# Patient Record
Sex: FEMALE | Race: BLACK OR AFRICAN AMERICAN | NOT HISPANIC OR LATINO | Employment: UNEMPLOYED | URBAN - METROPOLITAN AREA
[De-identification: names, ages, dates, MRNs, and addresses within clinical notes are randomized per-mention and may not be internally consistent; named-entity substitution may affect disease eponyms.]

---

## 2018-05-25 ENCOUNTER — OFFICE VISIT (OUTPATIENT)
Dept: OPHTHALMOLOGY | Facility: CLINIC | Age: 38
End: 2018-05-25
Attending: OPHTHALMOLOGY
Payer: COMMERCIAL

## 2018-05-25 DIAGNOSIS — H40.1190 POAG (PRIMARY OPEN-ANGLE GLAUCOMA): Primary | ICD-10-CM

## 2018-05-25 DIAGNOSIS — H40.1123 PRIMARY OPEN ANGLE GLAUCOMA OF LEFT EYE, SEVERE STAGE: ICD-10-CM

## 2018-05-25 PROCEDURE — 92083 EXTENDED VISUAL FIELD XM: CPT | Mod: ZF | Performed by: OPHTHALMOLOGY

## 2018-05-25 PROCEDURE — G0463 HOSPITAL OUTPT CLINIC VISIT: HCPCS | Mod: ZF

## 2018-05-25 RX ORDER — ZOLPIDEM TARTRATE 10 MG/1
1 TABLET ORAL
Refills: 1 | COMMUNITY
Start: 2018-05-17

## 2018-05-25 RX ORDER — HYDROXYCHLOROQUINE SULFATE 200 MG/1
200 TABLET, FILM COATED ORAL 2 TIMES DAILY
COMMUNITY
Start: 2018-02-08 | End: 2019-02-08

## 2018-05-25 RX ORDER — DORZOLAMIDE HYDROCHLORIDE AND TIMOLOL MALEATE 20; 5 MG/ML; MG/ML
1 SOLUTION/ DROPS OPHTHALMIC 2 TIMES DAILY
Qty: 1 BOTTLE | Refills: 11 | Status: SHIPPED | OUTPATIENT
Start: 2018-05-25

## 2018-05-25 ASSESSMENT — TONOMETRY
OD_IOP_MMHG: 16
IOP_METHOD: APPLANATION
OS_IOP_MMHG: 16

## 2018-05-25 ASSESSMENT — REFRACTION_WEARINGRX
OD_CYLINDER: +2.25
OS_SPHERE: -8.50
OD_AXIS: 078
OD_SPHERE: -4.50
OS_CYLINDER: +4.00
OS_AXIS: 088

## 2018-05-25 ASSESSMENT — SLIT LAMP EXAM - LIDS
COMMENTS: NORMAL
COMMENTS: MILD PTOSIS

## 2018-05-25 ASSESSMENT — CONF VISUAL FIELD
METHOD: COUNTING FINGERS
OS_INFERIOR_NASAL_RESTRICTION: 1
OS_INFERIOR_TEMPORAL_RESTRICTION: 1
OS_SUPERIOR_TEMPORAL_RESTRICTION: 1
OD_NORMAL: 1
OS_SUPERIOR_NASAL_RESTRICTION: 1

## 2018-05-25 ASSESSMENT — VISUAL ACUITY
METHOD_MR: PT DECLINES MR TODAY
METHOD: SNELLEN - LINEAR
OS_CC: 125
CORRECTION_TYPE: GLASSES
OS_PH_CC: 20/80
OD_CC: 20/20

## 2018-05-25 ASSESSMENT — CUP TO DISC RATIO
OD_RATIO: 0.4
OS_RATIO: 0.9

## 2018-05-25 NOTE — NURSING NOTE
Chief Complaints and History of Present Illnesses   Patient presents with     Glaucoma Follow Up     HPI    Affected eye(s):  Both   Symptoms:     Redness   Tearing   Dryness   Itching      Frequency:  Intermittent       Do you have eye pain now?:  No      Comments:  Per pt no changes in va BE. Pt diagnosed with Lupus about a year and half ago. Pt concerned about medication she is has to take for Lupus and that it could affect her Glaucoma. Pt notes LE red, watery, and itchy (intermitent) x 1 year.     Batsheva Joy@ [facility] 9:46 AM May 25, 2018

## 2018-05-25 NOTE — PROGRESS NOTES
CC: Glaucoma follow up    HPI: Haroon Mcrae is a 37 year old female with advanced stage glaucoma of the left eye s/p trabeculectomy by Dr Pressley in 2009. Patient is unsure of the etiology of her glaucoma.  Patient reports sudden loss of vision left eye in 2007.She notes stable vision. Occasional redness and watering of her left eye.     Past Ocular History:  Trabeculectomy left eye 2015 (German Grider)  Strabismus LLRc and LMRs 10-31-13    3-19-15 Exploration and lysis of adhesions on left medial rectus muscle. Excision of 5.0 mm of stretch scar of left medial rectus muscle. Recession/hangback of left medial rectus muscle 2.0 mm Left IRs of 5.0 millimeters.  Prolapsed orbital fat ST left eye      Current meds:  Cosopt 1 drop twice daily both eyes     Testing Today:     Octopus visual field                         24-2 right eye normal                        LVC left eye extinguished     OCT rNFL  OD: Nasal change from 2015 otherwise stable with temporal thinning  OS: severe thinning, SN change from 2015     Impression/Plan:  Advanced stage glaucoma left eye, ocular hypertension right eye vs pre-perimetric glaucoma.    -Goal mid teens  -intraocular pressure at target both eyes     Return to clinic in 4-6 months for IOP check and dilated exam      Lars Borden MD  Ophthalmology, PGY-3    Attending Physician Attestation:  Complete documentation of historical and exam elements from today's encounter can be found in the full encounter summary report (not reduplicated in this progress note). I personally obtained the chief complaint(s) and history of present illness. I confirmed and edited asnecessary the review of systems, past medical/surgical history, family history, social history, and examination findings as documented by others; and I examined the patient myself. I personally reviewed the relevant tests, images, and reports as documented above. I formulated and edited as necessary the assessment and plan and  discussed the findings and management plan with the patient and family.  - Romina Combs MD 12:18 PM 5/25/2018

## 2018-05-25 NOTE — MR AVS SNAPSHOT
After Visit Summary   2018    Haroon Mcrae    MRN: [id number]           Patient Information     Date Of Birth          1980        Visit Information        Provider Department      2018 9:30 AM Romina Combs MD [facility]        Today's Diagnoses     POAG (primary open-angle glaucoma)    -  1    Primary open angle glaucoma of left eye, severe stage           Follow-ups after your visit        Follow-up notes from your care team     Return in about 6 months (around 2018) for dilation and 10-2 od .      Your next 10 appointments already scheduled     2018  8:15 AM CST   RETURN GLAUCOMA with Romina Combs MD   Eye Clinic ([facility])    [address]  [facility] [id number] [phone]            2018  9:00 AM CST   NEW RETINA with Pao Waller MD   Eye Clinic ([facility])    [address] G[address]  [address] [address]  [facility] [id number] [phone]              Who to contact     Please call your clinic at [phone] to:    Ask questions about your health    Make or cancel appointments    Discuss your medicines    Learn about your test results    Speak to your doctor            Additional Information About Your Visit        MyChart Information     BUKAt is an electronic gateway that provides easy, online access to your medical records. With Yeahka, you can request a clinic appointment, read your test results, renew a prescription or communicate with your care team.     To sign up for BUKAt visit the website at [email or web address]   You will be asked to enter the access code listed below, as well as some personal information. Please follow the directions to create your username and password.     Your access code is: [email or web address]  Expires: 2018  6:31 AM     Your access code will  in 90 days. If you need help or a new code,  please contact your [facility] or call [phone] for assistance.        Care EveryWhere ID     This is your Care EveryWhere ID. This could be used by other organizations to access your [facility] medical records  [id number]         Blood Pressure from Last 3 Encounters:   03/19/15 129/78   10/31/13 133/87    Weight from Last 3 Encounters:   03/19/15 102.2 kg (225 lb 5 oz)   10/31/13 98.5 kg (217 lb 2.5 oz)              We Performed the Following     Low Vision Central OS     OVF 24-2 Dynamic OD          Where to get your medicines      These medications were sent to [facility] [address] [place] [address] & [address] [address]     Phone:  [phone]     dorzolamide-timolol 2-0.5 % ophthalmic solution          Primary Care Provider Fax #    Physician No Ref-Primary [phone]       No address on file        Equal Access to Services     JARETT ALONZO : Hadii aad ku hadasho Soomaali, waaxda luqadaha, qaybta kaalmada adeegyada, waxay fernandezin brando jaramillo . So [facility] [phone].    ATENCIÓN: Si habla español, tiene a montes disposición servicios gratuitos de asistencia lingüística. Llame al [phone].    We comply with applicable federal civil rights laws and [place] laws. We do not discriminate on the basis of race, color, national origin, age, disability, sex, sexual orientation, or gender identity.            Thank you!     Thank you for choosing [facility]  for your care. Our goal is always to provide you with excellent care. Hearing back from our patients is one way we can continue to improve our services. Please take a few minutes to complete the written survey that you may receive in the mail after your visit with us. Thank you!             Your Updated Medication List - Protect others around you: Learn how to safely use, store and throw away your medicines at www.disposemymeds.org.           This list is accurate as of 5/25/18 12:26 PM.  Always use your most recent med list.                   Brand Name Dispense Instructions for use Diagnosis    dorzolamide-timolol 2-0.5 % ophthalmic solution    COSOPT    1 Bottle    Place 1 drop into both eyes 2 times daily    Primary open angle glaucoma of left eye, severe stage       hydroxychloroquine 200 MG tablet    PLAQUENIL     Take 200 mg by mouth 2 times daily    POAG (primary open-angle glaucoma)       zolpidem 10 MG tablet    AMBIEN     1 tablet nightly as needed    POAG (primary open-angle glaucoma)

## 2021-07-22 ENCOUNTER — OFFICE VISIT (OUTPATIENT)
Dept: OPHTHALMOLOGY | Facility: CLINIC | Age: 41
End: 2021-07-22
Attending: OPHTHALMOLOGY
Payer: COMMERCIAL

## 2021-07-22 DIAGNOSIS — H25.13 AGE-RELATED NUCLEAR CATARACT OF BOTH EYES: ICD-10-CM

## 2021-07-22 DIAGNOSIS — H40.1133 PRIMARY OPEN ANGLE GLAUCOMA (POAG) OF BOTH EYES, SEVERE STAGE: Primary | ICD-10-CM

## 2021-07-22 PROCEDURE — 92133 CPTRZD OPH DX IMG PST SGM ON: CPT | Performed by: OPHTHALMOLOGY

## 2021-07-22 PROCEDURE — 99204 OFFICE O/P NEW MOD 45 MIN: CPT | Mod: GC | Performed by: OPHTHALMOLOGY

## 2021-07-22 PROCEDURE — 92083 EXTENDED VISUAL FIELD XM: CPT | Performed by: OPHTHALMOLOGY

## 2021-07-22 PROCEDURE — G0463 HOSPITAL OUTPT CLINIC VISIT: HCPCS

## 2021-07-22 RX ORDER — TIMOLOL MALEATE 5 MG/ML
1 SOLUTION/ DROPS OPHTHALMIC 2 TIMES DAILY
Qty: 10 ML | Refills: 4 | Status: SHIPPED | OUTPATIENT
Start: 2021-07-22

## 2021-07-22 RX ORDER — TRAZODONE HYDROCHLORIDE 50 MG/1
TABLET, FILM COATED ORAL
COMMUNITY
Start: 2021-07-02

## 2021-07-22 ASSESSMENT — VISUAL ACUITY
CORRECTION_TYPE: GLASSES
METHOD: SNELLEN - LINEAR
OD_CC: 20/20
OS_CC: 20/125

## 2021-07-22 ASSESSMENT — REFRACTION_WEARINGRX
OS_SPHERE: -8.00
OD_CYLINDER: +1.75
SPECS_TYPE: SVL
OD_AXIS: 081
OS_AXIS: 085
OD_SPHERE: -2.25
OS_CYLINDER: +4.00

## 2021-07-22 ASSESSMENT — CONF VISUAL FIELD
OD_NORMAL: 1
OS_SUPERIOR_TEMPORAL_RESTRICTION: 3
OS_INFERIOR_TEMPORAL_RESTRICTION: 1
OS_SUPERIOR_NASAL_RESTRICTION: 1
METHOD: COUNTING FINGERS
OS_INFERIOR_NASAL_RESTRICTION: 1

## 2021-07-22 ASSESSMENT — TONOMETRY
OS_IOP_MMHG: 8
IOP_METHOD: TONOPEN
IOP_METHOD: APPLANATION
OD_IOP_MMHG: 11
OD_IOP_MMHG: 15
OS_IOP_MMHG: 18
OD_IOP_MMHG: 12
OS_IOP_MMHG: 8
IOP_METHOD: APPLANATION

## 2021-07-22 ASSESSMENT — CUP TO DISC RATIO
OD_RATIO: 0.4
OS_RATIO: 0.9

## 2021-07-22 ASSESSMENT — SLIT LAMP EXAM - LIDS
COMMENTS: NORMAL
COMMENTS: MILD PTOSIS

## 2021-07-22 NOTE — NURSING NOTE
Chief Complaints and History of Present Illnesses   Patient presents with     Consult For     glaucoma     Chief Complaint(s) and History of Present Illness(es)     Consult For     Laterality: both eyes    Course: stable    Associated symptoms: flashes.  Negative for eye pain, headache and floaters    Treatments tried: no treatments    Pain scale: 0/10    Comments: glaucoma              Comments     She was referred here after her recent eye exam from Rhoda Hernández O.D.  Her vision has seemed stable in both eyes for the past couple years.  She has not taken Cosopt for the past year.      Occasionally she sees flashing in her right eye in the temporal periphery.    Cailin Shah, COT 1:53 PM  July 22, 2021

## 2021-07-22 NOTE — PROGRESS NOTES
Chief Complaint/Presenting Concern: Glaucoma Evaluation     History of Present Illness:   Haroon Mcrae is a 40 year old patient with a hx of Lupus who presents for evaluation of glaucoma. Patient has a hx of advanced stage glaucoma left eye s/p trabeculectomy by Dr. Pressley in 2009. Unsure the etiology of vision loss -- noticed sudden loss of vision in the left eye occurring in 2007. She has been last evaluated by Dr. Combs back in 5/2018 - and was continued on Cosopt to use in both eyes BID.     She reports that she had a new MRx obtained about 3 days ago from [facility]. She is happy with the vision and the glasses, without strain or headache with this. Overall she reports that her vision is stable -- she reports that the left eye has difficulty seeing and right eye is seeing well. She reports that she stopped using the Cosopt about a year ago. She denies any eye pain. Not following anyone for strabismus. No diplopia. Was told to come to get evaluated due to high IOP in the right eye.    She reports that all of a sudden back in 2007 she was working when she started noticing painless vision blurring along with drifting of her left eye and that was when she experienced vision loss.     Relevant Past Medical/Family/Social History:  - Hx of Lupus (Not on plaquenil currently).     Relevant Review of Systems:     Diagnosis: Primary open angle glaucoma  Advanced stage left eye; Ocular HTN right eye   Year diagnosis: 2007~   Previous glaucoma surgery/laser:   - Trabeculectomy left eye with mitomycin C (2009) - Dr. Grider.   Maximum intraocular pressure: 27/34  Currently Meds: Cosopt BID each eye   Family history: negative  CCT: 579 / 561   Gonio:   - Right Eye: Open to scleral spur x 4 quadrants -- ITC dispersed and present with < 180 degrees    - Left Eye: Open to scleral spur x 4 quadrants -- ITC dispersed and present with ~180 degrees - heavily pigmented superiorly   Refractive status: Axial myopia   Trauma  history: negative  Steroid exposure: positive (She may have used steroids periocularly around the time of surgery).   Vasospastic disease: Migrane/Raynaud phenomenon: negative  A past hemodynamic crisis or Low BP: negative  Meds AEs/intolerance: No  PMHx: No hx of asthma and respiratory problems. Was to obtain open heart cardiac surgery -- w/ anomlous coronary artery (although did not undergo this). No hx of renal/Kidney stones. No hx of sulfa allergies.   Anticoagulants: No    Today's testing:  IOP:  12 / 8 mmHg   Visual field July 22, 2020  - Right eye - Normal, reliable  - Left eye - MD = -33, generalized depression, reliable  OCT Optic Nerve RNFL Spectralis July 22, 2021  - Right Eye:  Temporal thinning, superonasal thinning compared to baseline OCT  - Left Eye: Diffuse thinning -- overall stable.    Additional Ocular History:     2) Hx of Strabismus Surgery  - Strabismus LLRc and LMRs: 10-31-13    - Exploration and lysis of adhesions on left medial rectus muscle. Excision of 5.0 mm of stretch scar of left medial rectus muscle. Recession/hangback of left medial rectus muscle 2.0 mm Left IRs of 5.0 millimeters: 3-19-15     3) Prolapsed orbital fat ST left eye       Plan/Recommendations:    Discussed findings with patient.    Patient has Advanced glaucoma in the left eye, and is a glaucoma suspect in the right eye with possible evidence of early RNFL thinning on OCT in the right eye.    IOP is lower than her baseline despite having stopped using Cosopt. She has been using a marijuana extract (Delta 8) in the form of pretzels, she consumes about 4 a day and has taken them before her clinic visit today. This may explain her good IOP. Given unclear efficacy and long lasting effect of the extract throughout the day, advised the patient to remain on glaucoma drops.    Start Timolol instead of Cosopt to avoid too low of a pressure.       RTC 3-4 weeks VA, IOP     Bhavesh Randle MD - PGY3   Department of  Ophthalmology  Pager: [phone]      Physician Attestation     Attending Physician Attestation:  Complete documentation of historical and exam elements from today's encounter can be found in the full encounter summary report (not reduplicated in this progress note). I personally obtained the chief complaint(s) and history of present illness. I confirmed and edited as necessary the review of systems, past medical/surgical history, family history, social history, and examination findings as documented by others; and I examined the patient myself. I personally reviewed the relevant tests, images, and reports as documented above. I personally reviewed the ophthalmic test(s) associated with this encounter, agree with the interpretation(s) as documented by the resident/fellow and have edited the corresponding report(s) as necessary. I formulated and edited as necessary the assessment and plan and discussed the findings and management plan with the patient and any family members present at the time of the visit.  Fadia Cardoza M.D., Glaucoma, July 22, 2021

## 2021-11-05 ENCOUNTER — OFFICE VISIT (OUTPATIENT)
Dept: OPHTHALMOLOGY | Facility: CLINIC | Age: 41
End: 2021-11-05
Attending: OPHTHALMOLOGY
Payer: COMMERCIAL

## 2021-11-05 DIAGNOSIS — H25.13 AGE-RELATED NUCLEAR CATARACT OF BOTH EYES: ICD-10-CM

## 2021-11-05 DIAGNOSIS — H40.1133 PRIMARY OPEN ANGLE GLAUCOMA (POAG) OF BOTH EYES, SEVERE STAGE: Primary | ICD-10-CM

## 2021-11-05 PROCEDURE — G0463 HOSPITAL OUTPT CLINIC VISIT: HCPCS

## 2021-11-05 PROCEDURE — 99214 OFFICE O/P EST MOD 30 MIN: CPT | Performed by: OPHTHALMOLOGY

## 2021-11-05 ASSESSMENT — TONOMETRY
OD_IOP_MMHG: 14
OS_IOP_MMHG: 19
IOP_METHOD: APPLANATION

## 2021-11-05 ASSESSMENT — CONF VISUAL FIELD
OD_NORMAL: 1
METHOD: COUNTING FINGERS
OS_SUPERIOR_NASAL_RESTRICTION: 1
OS_INFERIOR_NASAL_RESTRICTION: 1
OS_INFERIOR_TEMPORAL_RESTRICTION: 1
OS_SUPERIOR_TEMPORAL_RESTRICTION: 3

## 2021-11-05 ASSESSMENT — VISUAL ACUITY
METHOD: SNELLEN - LINEAR
CORRECTION_TYPE: GLASSES
OS_CC: 20/100
OS_PH_CC: 20/60
OS_PH_CC+: -2
OD_CC+: -1
OD_CC: 20/20
OS_CC+: -1

## 2021-11-05 ASSESSMENT — SLIT LAMP EXAM - LIDS
COMMENTS: MILD PTOSIS
COMMENTS: NORMAL

## 2021-11-05 NOTE — NURSING NOTE
Chief Complaints and History of Present Illnesses   Patient presents with     Glaucoma Follow-Up     Chief Complaint(s) and History of Present Illness(es)     Glaucoma Follow-Up     Laterality: left eye    Quality: blurred    Associated symptoms: discharge (le).  Negative for eye pain, itching, burning and dryness    Pain scale: 0/10              Comments     Concerned about white fluid 'maybe leaking out of my LE and it crusts onto my LLL.'  Cosopt BID to BE / LD @ 8 am on Monday.  Timolol every morning to BE / 8 am on Monday  EDEN Marley 7:59 AM 11/05/2021

## 2021-11-05 NOTE — PROGRESS NOTES
Chief Complaint/Presenting Concern: Glaucoma follow up    History of Present Illness:   Haroon Mcrae is a 40 year old patient with a hx of Lupus who presents for evaluation of glaucoma. Patient has a hx of advanced stage glaucoma left eye s/p trabeculectomy by Dr. Pressley in 2009. On most recent visit was asked to start timolol, reports being complaint with drop. No change in vision, no eye pain.     Relevant Past Medical/Family/Social History:  - Hx of Lupus (Not on plaquenil currently).     Relevant Review of Systems:     Diagnosis: Primary open angle glaucoma  Advanced stage left eye; Ocular HTN right eye   Year diagnosis: 2007~   Previous glaucoma surgery/laser:   - Trabeculectomy left eye with mitomycin C (2009) - Dr. Grider.   Maximum intraocular pressure: 27/34  Currently Meds: Cosopt BID each eye   Family history: negative  CCT: 579 / 561   Gonio:   - Right Eye: Open to scleral spur x 4 quadrants -- ITC dispersed and present with < 180 degrees    - Left Eye: Open to scleral spur x 4 quadrants -- ITC dispersed and present with ~180 degrees - heavily pigmented superiorly   Refractive status: Axial myopia   Trauma history: negative  Steroid exposure: positive (She may have used steroids periocularly around the time of surgery).   Vasospastic disease: Migrane/Raynaud phenomenon: negative  A past hemodynamic crisis or Low BP: negative  Meds AEs/intolerance: No  PMHx: No hx of asthma and respiratory problems. Was to obtain open heart cardiac surgery -- w/ anomlous coronary artery (although did not undergo this). No hx of renal/Kidney stones. No hx of sulfa allergies.   Anticoagulants: No  Visual field July 22, 2020  - Right eye - Normal, reliable  - Left eye - MD = -33, generalized depression, reliable  OCT Optic Nerve RNFL Spectralis July 22, 2021  - Right Eye:  Temporal thinning, superonasal thinning compared to baseline OCT  - Left Eye: Diffuse thinning -- overall stable.    Today's testing:  IOP:  12 / 8  mmHg     Additional Ocular History:     2) Hx of Strabismus Surgery  - Strabismus LLRc and LMRs: 10-31-13    - Exploration and lysis of adhesions on left medial rectus muscle. Excision of 5.0 mm of stretch scar of left medial rectus muscle. Recession/hangback of left medial rectus muscle 2.0 mm Left IRs of 5.0 millimeters: 3-19-15     3) Prolapsed orbital fat ST left eye     Plan/Recommendations:    Discussed findings with patient.    Patient has Advanced glaucoma in the left eye, and is a glaucoma suspect in the right eye with possible evidence of early RNFL thinning on OCT in the right eye.    IOP higher than target left eye, today off Marijuana. She has been using a marijuana extract (Delta 8) in the form of pretzels, she consumes about 4 a day, which explains the lower IOP on her prior visit.     Switch from Timolol to Cosopt      RTC in 2 months OCT RNFL      Physician Attestation     Attending Physician Attestation:  Complete documentation of historical and exam elements from today's encounter can be found in the full encounter summary report (not reduplicated in this progress note). I personally obtained the chief complaint(s) and history of present illness. I confirmed and edited as necessary the review of systems, past medical/surgical history, family history, social history, and examination findings as documented by others; and I examined the patient myself. I personally reviewed the relevant tests, images, and reports as documented above. I formulated and edited as necessary the assessment and plan and discussed the findings and management plan with the patient and any family members present at the time of the visit.  Fadia Cardoza M.D., Glaucoma, November 7, 2021

## 2021-12-14 DIAGNOSIS — H47.239 GLAUCOMATOUS ATROPHY OF OPTIC DISC, UNSPECIFIED LATERALITY: Primary | ICD-10-CM

## 2022-02-09 DIAGNOSIS — H40.1133 PRIMARY OPEN ANGLE GLAUCOMA (POAG) OF BOTH EYES, SEVERE STAGE: ICD-10-CM

## 2022-02-09 DIAGNOSIS — H47.239 GLAUCOMATOUS ATROPHY OF OPTIC DISC, UNSPECIFIED LATERALITY: Primary | ICD-10-CM

## 2022-03-28 DIAGNOSIS — H40.1133 PRIMARY OPEN ANGLE GLAUCOMA (POAG) OF BOTH EYES, SEVERE STAGE: Primary | ICD-10-CM

## 2022-03-29 ENCOUNTER — TELEPHONE (OUTPATIENT)
Dept: OPHTHALMOLOGY | Facility: CLINIC | Age: 42
End: 2022-03-29
Payer: COMMERCIAL

## 2022-03-29 ENCOUNTER — OFFICE VISIT (OUTPATIENT)
Dept: OPHTHALMOLOGY | Facility: CLINIC | Age: 42
End: 2022-03-29
Attending: OPHTHALMOLOGY
Payer: COMMERCIAL

## 2022-03-29 DIAGNOSIS — H40.1133 PRIMARY OPEN ANGLE GLAUCOMA (POAG) OF BOTH EYES, SEVERE STAGE: ICD-10-CM

## 2022-03-29 PROCEDURE — G0463 HOSPITAL OUTPT CLINIC VISIT: HCPCS

## 2022-03-29 PROCEDURE — 92133 CPTRZD OPH DX IMG PST SGM ON: CPT | Performed by: OPHTHALMOLOGY

## 2022-03-29 PROCEDURE — 99207 PR NON-BILLABLE SERV PER CHARTING: CPT | Performed by: OPHTHALMOLOGY

## 2022-03-29 ASSESSMENT — REFRACTION_WEARINGRX
OS_CYLINDER: +4.00
OD_SPHERE: -2.25
OS_SPHERE: -8.00
SPECS_TYPE: SVL
OD_CYLINDER: +1.75
OD_AXIS: 081
OS_AXIS: 085

## 2022-03-29 ASSESSMENT — TONOMETRY
OS_IOP_MMHG: 22
OD_IOP_MMHG: 24
IOP_METHOD: TONOPEN

## 2022-03-29 ASSESSMENT — VISUAL ACUITY
OS_CC: 20/100
OD_CC: 20/20
CORRECTION_TYPE: GLASSES
METHOD: SNELLEN - LINEAR
OD_CC+: -2

## 2022-03-29 ASSESSMENT — CONF VISUAL FIELD
OS_INFERIOR_NASAL_RESTRICTION: 1
OS_INFERIOR_TEMPORAL_RESTRICTION: 1
OS_SUPERIOR_NASAL_RESTRICTION: 1
OD_NORMAL: 1
OS_SUPERIOR_TEMPORAL_RESTRICTION: 3
METHOD: COUNTING FINGERS

## 2022-03-29 NOTE — NURSING NOTE
Chief Complaints and History of Present Illnesses   Patient presents with     Glaucoma Follow-Up     Chief Complaint(s) and History of Present Illness(es)     Glaucoma Follow-Up     Laterality: both eyes    Associated symptoms: Negative for floaters, itching and dryness    Treatment side effects: none    Compliance with Treatment: always    Pain scale: 0/10              Comments     Glaucoma follow up.    The patient is using Cosopt twice daily in both eyes.  The patient notes an intermittent right eye flash.  LEO Gaines, COA 10:13 AM 03/29/2022

## 2022-03-30 ENCOUNTER — TELEPHONE (OUTPATIENT)
Dept: OPHTHALMOLOGY | Facility: CLINIC | Age: 42
End: 2022-03-30
Payer: COMMERCIAL

## 2022-03-30 NOTE — TELEPHONE ENCOUNTER
[facility] Call Center    Phone Message    May a detailed message be left on voicemail: yes     Reason for Call: Other: Pt called in wanting to talk about yesterdays appt. Please call to discuss. Thank you     Action Taken: Message routed to:  [facility] Eye    Travel Screening: Not Applicable

## 2022-03-30 NOTE — TELEPHONE ENCOUNTER
Called and spoke to Haroon Arndt     Made her an appointment for a follow up in two months with Dr. Trevizo for 5/31 @ 945 am     Dr. Rafal Monroe / Sony Garcia is wondering about the results of her imaging that was done. She had to leave before Dr. Gann could talk to her due to transportation waiting to pick her up.     Thanks     Corinne

## 2022-04-01 NOTE — TELEPHONE ENCOUNTER
Left message: Per Dr. Cardoza, the imaging looks about the same as last time, but your pressure was high. She would like you to see you in office earlier than your next 5/31 appointment to see about lowering it if possible. Please call our scheduling line at [phone] to get that set up.    JAZMIN DORANTES 10:02 AM April 1, 2022

## 2022-10-19 DIAGNOSIS — H40.1133 PRIMARY OPEN ANGLE GLAUCOMA (POAG) OF BOTH EYES, SEVERE STAGE: Primary | ICD-10-CM

## 2022-10-25 ENCOUNTER — OFFICE VISIT (OUTPATIENT)
Dept: OPHTHALMOLOGY | Facility: CLINIC | Age: 42
End: 2022-10-25
Attending: OPHTHALMOLOGY
Payer: COMMERCIAL

## 2022-10-25 DIAGNOSIS — H40.1133 PRIMARY OPEN ANGLE GLAUCOMA (POAG) OF BOTH EYES, SEVERE STAGE: ICD-10-CM

## 2022-10-25 PROCEDURE — G0463 HOSPITAL OUTPT CLINIC VISIT: HCPCS | Mod: 25

## 2022-10-25 PROCEDURE — 92083 EXTENDED VISUAL FIELD XM: CPT | Performed by: OPHTHALMOLOGY

## 2022-10-25 PROCEDURE — 99214 OFFICE O/P EST MOD 30 MIN: CPT | Performed by: OPHTHALMOLOGY

## 2022-10-25 PROCEDURE — 92133 CPTRZD OPH DX IMG PST SGM ON: CPT | Performed by: OPHTHALMOLOGY

## 2022-10-25 RX ORDER — DORZOLAMIDE HYDROCHLORIDE AND TIMOLOL MALEATE PRESERVATIVE FREE 20; 5 MG/ML; MG/ML
1 SOLUTION/ DROPS OPHTHALMIC 2 TIMES DAILY
Qty: 60 EACH | Refills: 4 | Status: SHIPPED | OUTPATIENT
Start: 2022-10-25

## 2022-10-25 ASSESSMENT — CONF VISUAL FIELD
OS_INFERIOR_NASAL_RESTRICTION: 1
OD_INFERIOR_NASAL_RESTRICTION: 0
OD_SUPERIOR_NASAL_RESTRICTION: 0
METHOD: COUNTING FINGERS
OS_SUPERIOR_TEMPORAL_RESTRICTION: 3
OD_SUPERIOR_TEMPORAL_RESTRICTION: 0
OS_SUPERIOR_NASAL_RESTRICTION: 1
OD_INFERIOR_TEMPORAL_RESTRICTION: 0
OD_NORMAL: 1
OS_INFERIOR_TEMPORAL_RESTRICTION: 1

## 2022-10-25 ASSESSMENT — TONOMETRY
OS_IOP_MMHG: 15
OS_IOP_MMHG: 19
IOP_METHOD: TONOPEN
IOP_METHOD: APPLANATION
OD_IOP_MMHG: 17
OD_IOP_MMHG: 15

## 2022-10-25 ASSESSMENT — VISUAL ACUITY
METHOD: SNELLEN - LINEAR
OD_SC+: -1
OS_SC: 20/150
OS_PH_SC+: -1
OD_SC: 20/20
OS_PH_SC: 20/80

## 2022-10-25 ASSESSMENT — SLIT LAMP EXAM - LIDS
COMMENTS: MILD PTOSIS
COMMENTS: NORMAL

## 2022-10-25 NOTE — PATIENT INSTRUCTIONS
Cosopt (Timolol/Dorzolamide) Preservative free which is a blue top- apply 1 drop twice a day in the each eye

## 2022-10-25 NOTE — PROGRESS NOTES
Chief Complaint/Presenting Concern: Glaucoma follow up    History of Present Illness:   Haroon Mcrae is a 40 year old patient with a hx of Lupus who presents for evaluation of glaucoma. Patient has a hx of advanced stage glaucoma left eye s/p trabeculectomy by Dr. Pressley in 2009. On most recent visit was asked to start Cosopt, reports not using the drop due to curing sensation with it's use.     Relevant Past Medical/Family/Social History:  - Hx of Lupus (Not on plaquenil currently).     Relevant Review of Systems:     Diagnosis: Primary open angle glaucoma  Advanced stage left eye; Ocular HTN right eye   Year diagnosis: 2007~   Previous glaucoma surgery/laser:   - Trabeculectomy left eye with mitomycin C (2009) - Dr. Grider.   Maximum intraocular pressure: 27/34  Currently Meds: Cosopt BID each eye   Family history: negative  CCT: 579 / 561   Gonio:   - Right Eye: Open to scleral spur x 4 quadrants -- ITC dispersed and present with < 180 degrees    - Left Eye: Open to scleral spur x 4 quadrants -- ITC dispersed and present with ~180 degrees - heavily pigmented superiorly   Refractive status: Axial myopia   Trauma history: negative  Steroid exposure: positive (She may have used steroids periocularly around the time of surgery).   Vasospastic disease: Migrane/Raynaud phenomenon: negative  A past hemodynamic crisis or Low BP: negative  Meds AEs/intolerance: No  PMHx: No hx of asthma and respiratory problems. Was to obtain open heart cardiac surgery -- w/ anomlous coronary artery (although did not undergo this). No hx of renal/Kidney stones. No hx of sulfa allergies.   Anticoagulants: No    Today's testing:  IOP:  15/15 mmHg  Visual field 10/25/22  - Right eye - Normal, high FP  - Left eye - generalized depression, reliable  OCT Optic Nerve RNFL Spectralis 10/25/22  - Right Eye:  Temporal thinning, superonasal thinning compared to baseline OCT  - Left Eye: Diffuse thinning -- overall stable.    Additional Ocular  History:     2) Hx of Strabismus Surgery  - Strabismus LLRc and LMRs: 10-31-13    - Exploration and lysis of adhesions on left medial rectus muscle. Excision of 5.0 mm of stretch scar of left medial rectus muscle. Recession/hangback of left medial rectus muscle 2.0 mm Left IRs of 5.0 millimeters: 3-19-15     3) Prolapsed orbital fat ST left eye     Plan/Recommendations:    Discussed findings with patient.    Patient has Advanced glaucoma in the left eye, and is a glaucoma suspect in the right eye with possible evidence of early RNFL thinning on OCT in the right eye. I recommend keeping IOP in the low-mid teens left eye and < 18 mmHg right eye. She has had higher IOP readings off treatment recently, I recommend being on treatment each eye. Patient is intolerant to the Cosopt drop (she feels like acid goes in the eye). Offered the patient SLT right eye and had a long discussion with her on the treatment options of drops, SLT or MIGS for the right eye. Long discussion on the risk of blindness from glaucoma, and importance for follow up. She believes the cannabis is the better option, I explained that this is not my recommendation due to the cannabis not controlling IOP reliably and the risk of systemic side effects. She would like to proceed with cannabis card and is not interested in SLT. She will try Cosopt PF each eye     Switch from Cosopt to Cosopt PF each eye    Advised the patient no t to use Marijuana on the day of IOP Check. She is using a marijuana extract (Delta 8) in the form of pretzels, she consumes about 4 a day, which explains the lower IOP on her prior visit.     Refer for refraction     RTC in 6 weeks VA, IOP   Patient does not want VF repeated in the left eye on future visits       Physician Attestation     Attending Physician Attestation:  Complete documentation of historical and exam elements from today's encounter can be found in the full encounter summary report (not reduplicated in this progress  note). I personally obtained the chief complaint(s) and history of present illness. I confirmed and edited as necessary the review of systems, past medical/surgical history, family history, social history, and examination findings as documented by others; and I examined the patient myself. I personally reviewed the relevant tests, images, and reports as documented above. I personally reviewed the ophthalmic test(s) associated with this encounter. I formulated and edited as necessary the assessment and plan and discussed the findings and management plan with the patient and any family members present at the time of the visit.  Fadia Cardoza M.D., Glaucoma, October 25, 2022

## 2022-10-25 NOTE — NURSING NOTE
Chief Complaints and History of Present Illnesses   Patient presents with     Glaucoma Follow-Up     Chief Complaint(s) and History of Present Illness(es)     Glaucoma Follow-Up            Laterality: both eyes          Comments    Pt states her vision is better without glasses.  Pt has not been wearing glasses, except for when she drives at night.  No flashes or floaters.  No pain in either.  No red, dry or teary eyes.     Ocular meds:  dorzolamide-timolol (COSOPT) 1 drop, Both Eyes, 2 TIMES DAILY        timolol maleate (TIMOPTIC) 1 drop, Both Eyes, 2 TIMES DAILY    LIDIA BAILEY October 25, 2022 8:41 AM

## 2022-11-08 ENCOUNTER — TELEPHONE (OUTPATIENT)
Dept: OPHTHALMOLOGY | Facility: CLINIC | Age: 42
End: 2022-11-08

## 2022-11-08 NOTE — TELEPHONE ENCOUNTER
LM on VM. We need her email address for the enrollment link and instructions.     Monica Handy, EDEN 3:35 PM 11/08/2022

## 2022-11-08 NOTE — TELEPHONE ENCOUNTER
[facility] Call Center    Phone Message    May a detailed message be left on voicemail: yes     Reason for Call: Other: Pt states that at her last Appt it was discussed getting her registered as a glaucoma pt in order to obtain a cannabis card, but she has not heard from the clinic regarding if this has been done or not. Please call pt with an update. Thank you.     Action Taken: Message routed to:  [facility] EYE    Travel Screening: Not Applicable

## 2022-11-09 NOTE — TELEPHONE ENCOUNTER
Spoke with pt.     She gave her email address. We submitted her request. She will find out via email what the next steps are.     Pt has no other questions.     EDEN Mair 9:54 AM 11/09/2022